# Patient Record
Sex: FEMALE | Race: WHITE | NOT HISPANIC OR LATINO | Employment: STUDENT | URBAN - METROPOLITAN AREA
[De-identification: names, ages, dates, MRNs, and addresses within clinical notes are randomized per-mention and may not be internally consistent; named-entity substitution may affect disease eponyms.]

---
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
## 2022-03-23 ENCOUNTER — LAB REQUISITION (OUTPATIENT)
Dept: LAB | Facility: CLINIC | Age: 22
End: 2022-03-23
Payer: COMMERCIAL

## 2022-03-23 DIAGNOSIS — R30.0 DYSURIA: ICD-10-CM

## 2022-03-23 DIAGNOSIS — R19.7 DIARRHEA, UNSPECIFIED: ICD-10-CM

## 2022-03-23 LAB — MAGNESIUM SERPL-MCNC: 2 MG/DL (ref 1.8–2.6)

## 2022-03-23 PROCEDURE — 87086 URINE CULTURE/COLONY COUNT: CPT | Mod: ORL | Performed by: PEDIATRICS

## 2022-03-23 PROCEDURE — 83735 ASSAY OF MAGNESIUM: CPT | Mod: ORL | Performed by: PEDIATRICS

## 2022-03-25 LAB — BACTERIA UR CULT: NO GROWTH

## 2022-10-06 ENCOUNTER — TRANSFERRED RECORDS (OUTPATIENT)
Dept: HEALTH INFORMATION MANAGEMENT | Facility: CLINIC | Age: 22
End: 2022-10-06

## 2022-10-27 ENCOUNTER — TRANSFERRED RECORDS (OUTPATIENT)
Dept: HEALTH INFORMATION MANAGEMENT | Facility: CLINIC | Age: 22
End: 2022-10-27

## 2022-11-03 ENCOUNTER — TELEPHONE (OUTPATIENT)
Dept: ANESTHESIOLOGY | Facility: CLINIC | Age: 22
End: 2022-11-03

## 2022-11-03 NOTE — TELEPHONE ENCOUNTER
The writer spoke to the mother and patient. A video appt was scheduled with Dr. Mena on Nov 10 th at 11:30 am. Both the patient and her mother confirmed that this date and time works for them.

## 2022-11-10 ENCOUNTER — VIRTUAL VISIT (OUTPATIENT)
Dept: ANESTHESIOLOGY | Facility: CLINIC | Age: 22
End: 2022-11-10
Payer: COMMERCIAL

## 2022-11-10 DIAGNOSIS — G89.3 CANCER RELATED PAIN: Primary | ICD-10-CM

## 2022-11-10 PROCEDURE — 99204 OFFICE O/P NEW MOD 45 MIN: CPT | Mod: 95 | Performed by: ANESTHESIOLOGY

## 2022-11-10 RX ORDER — METHYLPHENIDATE HYDROCHLORIDE 20 MG/1
20 TABLET ORAL 2 TIMES DAILY
COMMUNITY

## 2022-11-10 RX ORDER — LOPERAMIDE HCL 2 MG
2 CAPSULE ORAL 4 TIMES DAILY PRN
COMMUNITY

## 2022-11-10 RX ORDER — METHADONE HYDROCHLORIDE 5 MG/1
5 TABLET ORAL EVERY 8 HOURS
COMMUNITY

## 2022-11-10 RX ORDER — LISINOPRIL 5 MG/1
5 TABLET ORAL DAILY
COMMUNITY

## 2022-11-10 RX ORDER — METHYLPHENIDATE HYDROCHLORIDE 27 MG/1
72 TABLET ORAL
COMMUNITY

## 2022-11-10 RX ORDER — ONDANSETRON 8 MG/1
TABLET, FILM COATED ORAL EVERY 8 HOURS PRN
COMMUNITY

## 2022-11-10 RX ORDER — DIAZEPAM 5 MG
5 TABLET ORAL EVERY 6 HOURS PRN
COMMUNITY

## 2022-11-10 ASSESSMENT — PAIN SCALES - GENERAL: PAINLEVEL: SEVERE PAIN (6)

## 2022-11-10 ASSESSMENT — ANXIETY QUESTIONNAIRES
GAD7 TOTAL SCORE: 15
5. BEING SO RESTLESS THAT IT IS HARD TO SIT STILL: NEARLY EVERY DAY
1. FEELING NERVOUS, ANXIOUS, OR ON EDGE: SEVERAL DAYS
2. NOT BEING ABLE TO STOP OR CONTROL WORRYING: NEARLY EVERY DAY
4. TROUBLE RELAXING: NEARLY EVERY DAY
7. FEELING AFRAID AS IF SOMETHING AWFUL MIGHT HAPPEN: NEARLY EVERY DAY
7. FEELING AFRAID AS IF SOMETHING AWFUL MIGHT HAPPEN: NEARLY EVERY DAY
IF YOU CHECKED OFF ANY PROBLEMS ON THIS QUESTIONNAIRE, HOW DIFFICULT HAVE THESE PROBLEMS MADE IT FOR YOU TO DO YOUR WORK, TAKE CARE OF THINGS AT HOME, OR GET ALONG WITH OTHER PEOPLE: NOT DIFFICULT AT ALL
3. WORRYING TOO MUCH ABOUT DIFFERENT THINGS: NOT AT ALL
GAD7 TOTAL SCORE: 15
8. IF YOU CHECKED OFF ANY PROBLEMS, HOW DIFFICULT HAVE THESE MADE IT FOR YOU TO DO YOUR WORK, TAKE CARE OF THINGS AT HOME, OR GET ALONG WITH OTHER PEOPLE?: NOT DIFFICULT AT ALL
GAD7 TOTAL SCORE: 15
6. BECOMING EASILY ANNOYED OR IRRITABLE: MORE THAN HALF THE DAYS

## 2022-11-10 ASSESSMENT — PAIN SCALES - PAIN ENJOYMENT GENERAL ACTIVITY SCALE (PEG)
INTERFERED_GENERAL_ACTIVITY: 9
AVG_PAIN_PASTWEEK: 9
INTERFERED_ENJOYMENT_LIFE: 8
INTERFERED_ENJOYMENT_LIFE: 8
AVG_PAIN_PASTWEEK: 9
PEG_TOTALSCORE: 8.67
PEG_TOTALSCORE: 8.67
INTERFERED_GENERAL_ACTIVITY: 9

## 2022-11-10 NOTE — PROGRESS NOTES
"Video-Visit Details    Type of service:  Video Visit    Video Start Time (time video started): 11:44am    Video End Time (time video stopped):12:52 PM    Originating Location (pt. Location): Other inpatient        Distant Location (provider location):  On Site    Mode of Communication:  Video Conference via [employer]    Physician has received verbal consent for a Video Visit from the patient? Yes      Pain Clinic New Patient Consult Note:    Referring Provider: Hanane   Primary care provider: Tamela Werner.    23yo female with a very complex medical history, initial diagnosis of malignant neoplasm of liver, stave KRISTOPHER hepatocellular carcinoma, presenting as referral for evaluation of chronic right shoulder/arm pain. Pt presents with her family and hospital providers from [facility] where she was recently admitted due to an acute pain exacerbation. She reports she has been struggling with right upper extremity pain for multiple months. Described as aching/tingling, involving the dorsal and ventral aspect of her arm, radiating into all 5 of her digits. Reports feelings of \"heaviness\". She is unable to reproduce the symptoms with positional changes. No associated neck pain, headache, vision changes, fevers, or chills. No loss of coordination.    During this inpatient stay, she had an interscalene cather placed and is receiving infusions of ketamine, dilaudid, bupivacaine.  She reports these infusions are helping control the pain somewhat, but not greater than 50%. Say the pain is still there, but \"feels different\". States this pain has been gradually increasing over the last 6 months, but has been at its worst over this past weekend.     Pt has been taking methadone and po dilauded, celebrex, and lyrica, diazepam, flexaril. She reports needing the 2mg prn dilauded \"maybe\" once per day.     HPI:  Patient Supplied Answers To the [facility] Pain Questionnaire  [facility] -  Patient Entered Questionnaire/Answers " 11/10/2022   What number best describes your pain right now:  0 = No pain  to  10 = Worst pain imaginable 6   How would you describe the pain? dull, aching, pressure   Which of the following worsen your pain? standing, sitting, walking, relaxation   Which of the following improve or reduce your pain? medication   What number best describes your average pain for the past week:  0 = No pain  to  10 = Worst pain imaginable 6   What number best describes your LOWEST pain in past 24 hours:  0 = No pain  to  10 = Worst pain imaginable 5   What number best describes your WORST pain in past 24 hours:  0 = No pain  to  10 = Worst pain imaginable 8   When is your pain worst? AM, PM, Night, Constant   What non-medicine treatments have you already had for your pain? acupuncture, counseling       Pain treatments:    Herbal medicines: no  Physical therapy: no  Chiropractor: no  Pain physician: no  Surgery: no  Biofeedback: no  Acupuncture: no    Tests/Imaging reviewed with the patient:    Most recent MRI    Significant Medical History:   No past medical history on file.       Past Surgical History:  No past surgical history on file.       Family History:  No family history on file.       Social History:  Social History     Socioeconomic History    Marital status: Single     Spouse name: Not on file    Number of children: Not on file    Years of education: Not on file    Highest education level: Not on file   Occupational History    Not on file   Tobacco Use    Smoking status: Never    Smokeless tobacco: Never   Substance and Sexual Activity    Alcohol use: Not on file    Drug use: Not on file    Sexual activity: Not on file   Other Topics Concern    Not on file   Social History Narrative    Not on file     Social Determinants of Health     Financial Resource Strain: Not on file   Food Insecurity: Not on file   Transportation Needs: Not on file   Physical Activity: Not on file   Stress: Not on file   Social Connections: Not on  file   Intimate Partner Violence: Not on file   Housing Stability: Not on file     Social History     Social History Narrative    Not on file          Allergies:  Allergies   Allergen Reactions    Oxycodone Itching       Current Medications:   Current Outpatient Medications   Medication Sig Dispense Refill    amLODIPine (NORVASC) 5 MG tablet Take 1 tablet (5 mg) by mouth daily 90 tablet 1    Blood Pressure Monitoring (BLOOD PRESSURE CUFF) MISC Aspen needs an appropriate size blood pressure cuff (she will measure her arm at home and communicate with you) and home blood pressure machine to check her blood pressure at home three times a week. 1 each 0    budesonide (ENTOCORT EC) 3 MG EC capsule Take 3 capsules by mouth daily      celecoxib (CELEBREX) 100 MG capsule Take 200 mg by mouth 2 times daily      diazepam (VALIUM) 5 MG tablet Take 5 mg by mouth every 6 hours as needed for anxiety      FLUoxetine (PROZAC) 20 MG capsule Take 3 capsules by mouth daily      granisetron (KYTRIL) 1 MG tablet Take 1 mg by mouth as needed      HYDROmorphone (DILAUDID) 2 MG tablet Take 1 tablet by mouth as needed      ibuprofen (ADVIL/MOTRIN) 600 MG tablet       levonorgestrel (MIRENA) 20 MCG/24HR IUD 1 each by Intrauterine route      lisinopril (ZESTRIL) 5 MG tablet Take 5 mg by mouth daily      loperamide (IMODIUM) 2 MG capsule Take 2 mg by mouth 4 times daily as needed for diarrhea      methadone (DOLOPHINE) 5 MG tablet Take 5 mg by mouth every 8 hours      methylphenidate (CONCERTA) 27 MG CR tablet Take 72 mg by mouth 72 mg take twice daily      methylphenidate (RITALIN) 20 MG tablet Take 20 mg by mouth 2 times daily Take 20 mg morning and 10 mg as needed      norethindrone (AYGESTIN) 5 MG tablet Take 2.5 mg by mouth daily      ondansetron (ZOFRAN) 8 MG tablet Take by mouth every 8 hours as needed for nausea      polyethylene glycol (MIRALAX) packet Take 17 g by mouth daily as needed      pregabalin (LYRICA) 225 MG capsule Take 225  mg by mouth 2 times daily 300 mg tablet take twice daily      Sennosides (SENNA) 8.6 MG CAPS Take 1 tablet by mouth 2 times daily      amphetamine-dextroamphetamine (ADDERALL XR) 20 MG 24 hr capsule Take 30 mg by mouth daily  (Patient not taking: Reported on 11/10/2022)      calcium polycarbophil (FIBERCON) 625 MG tablet Take 625 mg by mouth every other day (Patient not taking: Reported on 11/10/2022)      docusate sodium (COLACE) 100 MG capsule Take 100 mg by mouth daily (Patient not taking: Reported on 11/10/2022)      DULoxetine (CYMBALTA) 60 MG capsule Take 60 mg by mouth daily (Patient not taking: Reported on 11/10/2022)      magnesium 200 MG TABS Take 250 mg by mouth daily (Patient not taking: Reported on 11/10/2022)      omeprazole (PRILOSEC) 20 MG DR capsule Take 1 capsule by mouth daily (Patient not taking: Reported on 11/10/2022)      riboflavin 100 MG CAPS Take 100 mg by mouth daily (Patient not taking: Reported on 11/10/2022)            Current Pain Medications:  Medications related to Pain Management (From now, onward)      None             Past Pain Medications:    Hydromorphone 2mg PO PRN  Methadone 5mg/d  Cymbalta 60mg/d  Lyrica 225mg bid    Blood thinner:    none      Psychosocial History:     History of treatment for behavioral disorder: No  History of suicidal ideation or suicidal attempt: No    Review of Systems:      Physical Exam:   Limited by virtual visit.  Speaking appropriately and in full sentences. Coordination appears appropriate over webcam.  There were no vitals filed for this visit.      Laboratory results:  Recent Labs   Lab Test 08/27/20  1329      POTASSIUM 3.9   CHLORIDE 107   CO2 26   ANIONGAP 5   *   BUN 18   CR 0.91   PAWAN 9.4       CBC RESULTS: No results for input(s): WBC, RBC, HGB, HCT, MCV, MCH, MCHC, RDW, PLT in the last 46200 hours.      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 Pt was referred regarding potential interventions for her right arm pain, specifically SCS, PNS, or IT pump. 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 Considering there is no ideal proximal site for placement, and the fact that her currently placed interscalene catheter is not providing her significant relief, she is a poor candidate for PNS. Further, questions were raised in prior documentation regarding patient and family's ability to provide adequate post op care (dressing changes, f/u appointments, etc).     Pt and family have been speaking with hospice/palliative care regarding goals of next steps. Current estimates from her other providers suggest a 1-year lifespan. Recommend continuing these discussions as she may benefit from hospice and regular IV pain control.     I have summarized the patient s past medical history, discussed their clinical findings and the potential differential diagnosis with the patient. Significant past medical history pertinent to the patient s current condition includes metastatic liver cancer. The differential diagnosis discussed with the patient are listed above. I have discussed anatomy and possible sources of the pain using models and/or pictures (diagrams). I have discussed multi- disciplinary pain management options withthe patient as pertaining to their case as detailed above. The pain management options we discussed included, but were not limited to the recommendations below.  I also discussed with patient the risks, benefits and alternatives to each pain management option.  All of the patient s questions and concerns were answered to the best of my ability.    RECOMMENDATIONS:     1. Medications: No new changes    2. Procedure: None    I also discussed with the patient that the possible risks involved with interventional treatment  included, but are not limited to, no pain control, worsened pain, stroke,seizure, spinal headache, allergic reactions, introduction of infection or bleeding which may lead to emergent spine surgery, nerve damage, paralysis oreven death.    3. Physical therapy: Continue outpatient as tolerated.The patient will also discuss spine care and posture. Pool therapy and stretches can be considered if available.    Follow up: With referring provider and pain clinic as needed    Jair Jeffery DO    Answers for HPI/ROS submitted by the patient on 11/10/2022  ESTRELLITA 7 TOTAL SCORE: 15

## 2022-11-10 NOTE — NURSING NOTE
Patient presents with:  Consult: New Consult Urgent Cancer patient for cather      Severe Pain (6)     Pain Medications     Opioid Agonists Refills Start End     HYDROmorphone (DILAUDID) 2 MG tablet     1/22/2020     Sig - Route: Take 1 tablet by mouth as needed - Oral    Class: Historical    Earliest Fill Date: 1/22/2020          What medications are you using for pain? Dilaudid    (New patients only) Have you been seen by another pain clinic/ provider? yes    (Return Patients only) What refills are you needing today? No    Expectation Pain any ideal regarding her cancer

## 2022-11-10 NOTE — PATIENT INSTRUCTIONS
Treatment planning:    Recommendations will be written in the providers note for your Primary Care provider (OR other providers in your care team) to review and make changes to your therapies based on their discretion.       Recommended Follow up:      Follow up as needed.    To speak with a nurse, schedule/reschedule/cancel a clinic appointment, or request a medication refill call: [phone].    You can also reach us by [email or web address]: [email or web address]

## 2022-11-10 NOTE — LETTER
"11/10/2022       RE: Aspen Anthony  [address]  [facility] [address]     Dear Colleague,    Thank you for referring your patient, Aspen Anthony, to the [facility] FOR COMPREHENSIVE PAIN MANAGEMENT [place] at [facility]. Please see a copy of my visit note below.      Pain Clinic New Patient Consult Note:    Referring Provider: Hanane   Primary care provider: Tamela Werner.    23yo female with a very complex medical history, initial diagnosis of malignant neoplasm of liver, stave KRISTOPHER hepatocellular carcinoma, presenting as referral for evaluation of chronic right shoulder/arm pain. Pt presents with her family and hospital providers from [facility] where she was recently admitted due to an acute pain exacerbation. She reports she has been struggling with right upper extremity pain for multiple months. Described as aching/tingling, involving the dorsal and ventral aspect of her arm, radiating into all 5 of her digits. Reports feelings of \"heaviness\". She is unable to reproduce the symptoms with positional changes. No associated neck pain, headache, vision changes, fevers, or chills. No loss of coordination.    During this inpatient stay, she had an interscalene cather placed and is receiving infusions of ketamine, dilaudid, bupivacaine.  She reports these infusions are helping control the pain somewhat, but not greater than 50%. Say the pain is still there, but \"feels different\". States this pain has been gradually increasing over the last 6 months, but has been at its worst over this past weekend.     Pt has been taking methadone and po dilauded, celebrex, and lyrica, diazepam, flexaril. She reports needing the 2mg prn dilauded \"maybe\" once per day.     HPI:  Patient Supplied Answers To the [facility] Pain Questionnaire  [facility] Pain -  Patient Entered Questionnaire/Answers 11/10/2022   What number best describes your pain right now:  " 0 = No pain  to  10 = Worst pain imaginable 6   How would you describe the pain? dull, aching, pressure   Which of the following worsen your pain? standing, sitting, walking, relaxation   Which of the following improve or reduce your pain? medication   What number best describes your average pain for the past week:  0 = No pain  to  10 = Worst pain imaginable 6   What number best describes your LOWEST pain in past 24 hours:  0 = No pain  to  10 = Worst pain imaginable 5   What number best describes your WORST pain in past 24 hours:  0 = No pain  to  10 = Worst pain imaginable 8   When is your pain worst? AM, PM, Night, Constant   What non-medicine treatments have you already had for your pain? acupuncture, counseling       Pain treatments:    Herbal medicines: no  Physical therapy: no  Chiropractor: no  Pain physician: no  Surgery: no  Biofeedback: no  Acupuncture: no    Tests/Imaging reviewed with the patient:    Most recent MRI    Significant Medical History:   No past medical history on file.       Past Surgical History:  No past surgical history on file.       Family History:  No family history on file.       Social History:  Social History     Socioeconomic History     Marital status: Single     Spouse name: Not on file     Number of children: Not on file     Years of education: Not on file     Highest education level: Not on file   Occupational History     Not on file   Tobacco Use     Smoking status: Never     Smokeless tobacco: Never   Substance and Sexual Activity     Alcohol use: Not on file     Drug use: Not on file     Sexual activity: Not on file   Other Topics Concern     Not on file   Social History Narrative     Not on file     Social Determinants of Health     Financial Resource Strain: Not on file   Food Insecurity: Not on file   Transportation Needs: Not on file   Physical Activity: Not on file   Stress: Not on file   Social Connections: Not on file   Intimate Partner Violence: Not on file    Housing Stability: Not on file     Social History     Social History Narrative     Not on file          Allergies:  Allergies   Allergen Reactions     Oxycodone Itching       Current Medications:   Current Outpatient Medications   Medication Sig Dispense Refill     amLODIPine (NORVASC) 5 MG tablet Take 1 tablet (5 mg) by mouth daily 90 tablet 1     Blood Pressure Monitoring (BLOOD PRESSURE CUFF) MISC Aspen needs an appropriate size blood pressure cuff (she will measure her arm at home and communicate with you) and home blood pressure machine to check her blood pressure at home three times a week. 1 each 0     budesonide (ENTOCORT EC) 3 MG EC capsule Take 3 capsules by mouth daily       celecoxib (CELEBREX) 100 MG capsule Take 200 mg by mouth 2 times daily       diazepam (VALIUM) 5 MG tablet Take 5 mg by mouth every 6 hours as needed for anxiety       FLUoxetine (PROZAC) 20 MG capsule Take 3 capsules by mouth daily       granisetron (KYTRIL) 1 MG tablet Take 1 mg by mouth as needed       HYDROmorphone (DILAUDID) 2 MG tablet Take 1 tablet by mouth as needed       ibuprofen (ADVIL/MOTRIN) 600 MG tablet        levonorgestrel (MIRENA) 20 MCG/24HR IUD 1 each by Intrauterine route       lisinopril (ZESTRIL) 5 MG tablet Take 5 mg by mouth daily       loperamide (IMODIUM) 2 MG capsule Take 2 mg by mouth 4 times daily as needed for diarrhea       methadone (DOLOPHINE) 5 MG tablet Take 5 mg by mouth every 8 hours       methylphenidate (CONCERTA) 27 MG CR tablet Take 72 mg by mouth 72 mg take twice daily       methylphenidate (RITALIN) 20 MG tablet Take 20 mg by mouth 2 times daily Take 20 mg morning and 10 mg as needed       norethindrone (AYGESTIN) 5 MG tablet Take 2.5 mg by mouth daily       ondansetron (ZOFRAN) 8 MG tablet Take by mouth every 8 hours as needed for nausea       polyethylene glycol (MIRALAX) packet Take 17 g by mouth daily as needed       pregabalin (LYRICA) 225 MG capsule Take 225 mg by mouth 2 times daily  300 mg tablet take twice daily       Sennosides (SENNA) 8.6 MG CAPS Take 1 tablet by mouth 2 times daily       amphetamine-dextroamphetamine (ADDERALL XR) 20 MG 24 hr capsule Take 30 mg by mouth daily  (Patient not taking: Reported on 11/10/2022)       calcium polycarbophil (FIBERCON) 625 MG tablet Take 625 mg by mouth every other day (Patient not taking: Reported on 11/10/2022)       docusate sodium (COLACE) 100 MG capsule Take 100 mg by mouth daily (Patient not taking: Reported on 11/10/2022)       DULoxetine (CYMBALTA) 60 MG capsule Take 60 mg by mouth daily (Patient not taking: Reported on 11/10/2022)       magnesium 200 MG TABS Take 250 mg by mouth daily (Patient not taking: Reported on 11/10/2022)       omeprazole (PRILOSEC) 20 MG DR capsule Take 1 capsule by mouth daily (Patient not taking: Reported on 11/10/2022)       riboflavin 100 MG CAPS Take 100 mg by mouth daily (Patient not taking: Reported on 11/10/2022)            Current Pain Medications:  Medications related to Pain Management (From now, onward)    None           Past Pain Medications:    Hydromorphone 2mg PO PRN  Methadone 5mg/d  Cymbalta 60mg/d  Lyrica 225mg bid    Blood thinner:    none      Psychosocial History:     History of treatment for behavioral disorder: No  History of suicidal ideation or suicidal attempt: No    Review of Systems:      Physical Exam:   Limited by virtual visit.  Speaking appropriately and in full sentences. Coordination appears appropriate over webcam.  There were no vitals filed for this visit.      Laboratory results:  Recent Labs   Lab Test 08/27/20  1329      POTASSIUM 3.9   CHLORIDE 107   CO2 26   ANIONGAP 5   *   BUN 18   CR 0.91   PAWAN 9.4       CBC RESULTS: No results for input(s): WBC, RBC, HGB, HCT, MCV, MCH, MCHC, RDW, PLT in the last 66009 hours.      Imaging:       ASSESSMENT AND PLAN:     Encounter Diagnosis:    Right Arm Pain s/t Neoplasm    21yo female with hx of advanced hepatocellular  carcinoma involving metastasis to lungs. Pt was referred regarding potential interventions for her right arm pain, specifically SCS, PNS, or IT pump. 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 Considering there is no ideal proximal site for placement, and the fact that her currently placed interscalene catheter is not providing her significant relief, she is a poor candidate for PNS. Further, questions were raised in prior documentation regarding patient and family's ability to provide adequate post op care (dressing changes, f/u appointments, etc).     Pt and family have been speaking with hospice/palliative care regarding goals of next steps. Current estimates from her other providers suggest a 1-year lifespan. Recommend continuing these discussions as she may benefit from hospice and regular IV pain control.     I have summarized the patient s past medical history, discussed their clinical findings and the potential differential diagnosis with the patient. Significant past medical history pertinent to the patient s current condition includes metastatic liver cancer. The differential diagnosis discussed with the patient are listed above. I have discussed anatomy and possible sources of the pain using models and/or pictures (diagrams). I have discussed multi- disciplinary pain management options withthe patient as pertaining to their case as detailed above. The pain management options we discussed included, but were not limited to the recommendations below.  I also discussed with patient the risks, benefits and alternatives to each pain management option.  All of the patient s questions and concerns were answered to the best of my ability.    RECOMMENDATIONS:     1. Medications: No new changes    2. Procedure: None    I also discussed with the patient that the possible risks involved with interventional treatment included, but are not  limited to, no pain control, worsened pain, stroke,seizure, spinal headache, allergic reactions, introduction of infection or bleeding which may lead to emergent spine surgery, nerve damage, paralysis oreven death.    3. Physical therapy: Continue outpatient as tolerated.The patient will also discuss spine care and posture. Pool therapy and stretches can be considered if available.    Follow up: With referring provider and pain clinic as needed    Jair Jeffery DO    Answers for HPI/ROS submitted by the patient on 11/10/2022  ESTRELLITA 7 TOTAL SCORE: 15      ATTENDING ATTESTATION  I saw the patient along with the pain medicine fellow Dr. Jair Jeffery. Please see his note above for full details. I was involved in gathering history, physical examination and development of the plan of care.     ASSESSMENT AND PLAN:     Encounter Diagnosis:    Right upper extremity neuropathic pain  Cancer related pain.   Stage 4 hepatocellular cancer    Aspen Anthony is a 22 year old y.o. old female who presents to the pain clinic with via video visit. Notably the patient was admitted to the [facility] on Sunday. She is completing the video visit from the hospital room. Her palliative care physician is by her side, including her mother and multiple other health care providers. The patient has high hopes of receiving an intervention to improve her cancer related pain.     To prepare this complex case records were reviewed, including imaging from [facility]. Oncology notes from [facility] were reviewed and detailed conversations were completed with the interventional pain physicians at [facility]. On the record review it stood out to me that interventional approach was offered to the patient a while back which was more timely. At this time the patient has been actively considering hospice, comfort care and hospitalized for uncontrolled pain.   On record review the extent of her tumor stood out to me which was not  clear when the referral was accepted.   During the video visit, I noted that the interscalene cathter although providing relief it was not drastic and the patient was requiring intravenous continuous ketamine and iv dilaudid. I d/w the patient that PNS system would capture peripheral pain, there is a time lag for prior authorization and the therapy to work once the leads are placed. On a whole the delay can be about 8 weeks. I do not recommend holding hospice and comfort care for that long at this stage. During my conversations with the pain physicians at [facility] there were concerns about post operative care and follow up which could not be ignored. I also learned that the tumor spread is towards the nerve roots pointing towards more neuraxial source of pain which would not be covered by a PNS system.   Consideration was given to SCS and ITP. SCS again is a long process of prior authorization for both trial and implantation. Given the current clinical presentation ITP would not make a significant improvement in her clinical outcome and therefore this therapy is not recommended.   I discussed with the patient, family and the team that hospice and comfort care is probably the best option weighing the risk versus benefits. The patient was extremely disappointed hearing this as she was hoping for an intervention.     After the visit I closed the conversation with Dr. Koo.     Answers for HPI/ROS submitted by the patient on 11/10/2022  ESTRELLITA 7 TOTAL SCORE: 15        Sincerely,    Judith Mena MD

## 2022-11-10 NOTE — PROGRESS NOTES
Aspen is a 22 year old who is being evaluated via a billable video visit.      How would you like to obtain your AVS? MyChart  If the video visit is dropped, the invitation should be resent by: Send to e-mail at: No e-mail address on record  Will anyone else be joining your video visit? No

## 2022-11-11 NOTE — PROGRESS NOTES
ATTENDING ATTESTATION  I saw the patient along with the pain medicine fellow Dr. Jair Jeffery. Please see his note above for full details. I was involved in gathering history, physical examination and development of the plan of care.     ASSESSMENT AND PLAN:     Encounter Diagnosis:    Right upper extremity neuropathic pain  Cancer related pain.   Stage 4 hepatocellular cancer    Aspen Anthony is a 22 year old y.o. old female who presents to the pain clinic with via video visit. Notably the patient was admitted to the [facility] on Sunday. She is completing the video visit from the hospital room. Her palliative care physician is by her side, including her mother and multiple other health care providers. The patient has high hopes of receiving an intervention to improve her cancer related pain.     To prepare this complex case records were reviewed, including imaging from [facility]. Oncology notes from [facility] were reviewed and detailed conversations were completed with the interventional pain physicians at [facility]. On the record review it stood out to me that interventional approach was offered to the patient a while back which was more timely. At this time the patient has been actively considering hospice, comfort care and hospitalized for uncontrolled pain.   On record review the extent of her tumor stood out to me which was not clear when the referral was accepted.   During the video visit, I noted that the interscalene cathter although providing relief it was not drastic and the patient was requiring intravenous continuous ketamine and iv dilaudid. I d/w the patient that PNS system would capture peripheral pain, there is a time lag for prior authorization and the therapy to work once the leads are placed. On a whole the delay can be about 8 weeks. I do not recommend holding hospice and comfort care for that long at this stage. During my conversations with the pain physicians at  [facility] there were concerns about post operative care and follow up which could not be ignored. I also learned that the tumor spread is towards the nerve roots pointing towards more neuraxial source of pain which would not be covered by a PNS system.   Consideration was given to SCS and ITP. SCS again is a long process of prior authorization for both trial and implantation. Given the current clinical presentation ITP would not make a significant improvement in her clinical outcome and therefore this therapy is not recommended.   I discussed with the patient, family and the team that hospice and comfort care is probably the best option weighing the risk versus benefits. The patient was extremely disappointed hearing this as she was hoping for an intervention.     After the visit I closed the conversation with Dr. Koo.     Answers for HPI/ROS submitted by the patient on 11/10/2022  ESTRELLITA 7 TOTAL SCORE: 15

## 2023-04-06 ENCOUNTER — LAB REQUISITION (OUTPATIENT)
Dept: LAB | Facility: CLINIC | Age: 23
End: 2023-04-06
Payer: COMMERCIAL

## 2023-04-06 DIAGNOSIS — C22.0 LIVER CELL CARCINOMA (H): ICD-10-CM

## 2023-04-06 LAB — AMMONIA PLAS-SCNC: 19 UMOL/L (ref 11–51)

## 2023-04-06 PROCEDURE — 86140 C-REACTIVE PROTEIN: CPT | Mod: ORL | Performed by: PEDIATRICS

## 2023-04-06 PROCEDURE — 82140 ASSAY OF AMMONIA: CPT | Mod: ORL | Performed by: PEDIATRICS

## 2023-04-07 ENCOUNTER — LAB REQUISITION (OUTPATIENT)
Dept: LAB | Facility: CLINIC | Age: 23
End: 2023-04-07
Payer: COMMERCIAL

## 2023-04-07 DIAGNOSIS — R44.3 HALLUCINATIONS, UNSPECIFIED: ICD-10-CM

## 2023-04-07 LAB — CRP SERPL-MCNC: 153 MG/L

## 2023-04-07 PROCEDURE — 87088 URINE BACTERIA CULTURE: CPT | Mod: ORL | Performed by: PEDIATRICS

## 2023-04-11 LAB
BACTERIA UR CULT: ABNORMAL
BACTERIA UR CULT: ABNORMAL